# Patient Record
Sex: FEMALE | Race: WHITE
[De-identification: names, ages, dates, MRNs, and addresses within clinical notes are randomized per-mention and may not be internally consistent; named-entity substitution may affect disease eponyms.]

---

## 2018-04-03 ENCOUNTER — HOSPITAL ENCOUNTER (INPATIENT)
Dept: HOSPITAL 80 - F3E | Age: 43
LOS: 2 days | Discharge: HOME | DRG: 743 | End: 2018-04-05
Attending: OBSTETRICS & GYNECOLOGY | Admitting: OBSTETRICS & GYNECOLOGY
Payer: COMMERCIAL

## 2018-04-03 DIAGNOSIS — F32.81: ICD-10-CM

## 2018-04-03 DIAGNOSIS — D25.9: Primary | ICD-10-CM

## 2018-04-03 DIAGNOSIS — N80.0: ICD-10-CM

## 2018-04-03 LAB — PLATELET # BLD: 377 10^3/UL (ref 150–400)

## 2018-04-03 PROCEDURE — 0UT90ZZ RESECTION OF UTERUS, OPEN APPROACH: ICD-10-PCS | Performed by: OBSTETRICS & GYNECOLOGY

## 2018-04-03 PROCEDURE — 0UTC0ZZ RESECTION OF CERVIX, OPEN APPROACH: ICD-10-PCS | Performed by: OBSTETRICS & GYNECOLOGY

## 2018-04-03 RX ADMIN — ENOXAPARIN SODIUM SCH MG: 100 INJECTION SUBCUTANEOUS at 21:06

## 2018-04-03 RX ADMIN — GABAPENTIN SCH MG: 100 CAPSULE ORAL at 19:59

## 2018-04-03 RX ADMIN — OXYCODONE HYDROCHLORIDE AND ACETAMINOPHEN PRN TAB: 5; 325 TABLET ORAL at 16:26

## 2018-04-03 RX ADMIN — KETOROLAC TROMETHAMINE SCH MG: 30 INJECTION, SOLUTION INTRAMUSCULAR at 17:58

## 2018-04-03 RX ADMIN — Medication PRN MCG: at 13:39

## 2018-04-03 RX ADMIN — Medication PRN MCG: at 12:59

## 2018-04-03 NOTE — POSTOPPROG
Post Op Note


Date of Operation: 04/03/18


Surgeon: Horacio Yee


Assistant: Rae Borrero


Anesthesiologist: Beatriz Pereira


Anesthesia: GET(General Endotracheal), Spinal


Pre-op Diagnosis: Uterine fibroids, pelvic pain, abnormal uterine bleeding, PMDD


Post-op Diagnosis: Same, Stage I endometriosis


Procedure: Total abdominal hysterectomy, bilateral salpingo-oophorectomy, 

cystoscopy


Findings: Large, bulky fibroid uterus, small ruptured chocolate cyst left ovary


Inf/Abcess present in the surg proc area at time of surgery?: No


EBL: 80cc


Complications: 





None


Specimen(s): 





Uterus and cervix, bilateral tubes and ovaries

## 2018-04-03 NOTE — SUROPNOTE
[facility] Operative Report





- Surgery





Date of Operation: 04/03/18


Surgeon: Horacio Yee


Assistant: Rae Borrero


Anesthesiologist: Beatriz Pereira


Anesthesia: GET(General Endotracheal), Spinal


Pre-op Diagnosis: Uterine fibroids, pelvic pain, abnormal uterine bleeding, PMDD


Post-op Diagnosis: Same, Stage I endometriosis


Procedure: Total abdominal hysterectomy, bilateral salpingo-oophorectomy, 

cystoscopy


Findings: Large, bulky fibroid uterus, small ruptured chocolate cyst left ovary


Inf/Abcess present in the surg proc area at time of surgery?: No


EBL: 80cc


Complications: None


Specimen(s): Uterus and cervix, bilateral tubes and ovaries


Technique:The patient was brought to the operating room where she had a spinal 

placed and following this, general anesthesia was found to be adequate. She was 

positioned in low lithotomy and an exam under anesthesia was performed. She was 

prepped and draped in the usual sterile fashion and a ziegler catheter was placed 

under sterile conditions.  Weight-based antibiotics were given, as well as 5,

000 units of subcutaneous heparin.





A vertical midline incision was made with a scalpel from the pubic symphysis to 

the inferior aspect of the umbilicus, and carried down to the level of the 

fascia using the bovie. The fascia was nicked in the midline and this incision 

was extended the length of the skin incision. The peritoneum was elevated, 

incised and this incision extended with care taken to note the position of the 

bladder. Upon entry into the abdomen, the abdomen and pelvis were explored with 

the findings noted above. Pelvic washings were not collected. A self-retaining 

[place] retractor was placed and retractors placed for optimal visualization. 

Moist laparotomy sponges were used to pack the bowel cephalad.





The round ligaments were suture ligated and transected. The broad ligament was 

incised and this incision carried cephalad parallel to the ovarian vessels. The 

ureters were directly visualized bilaterally, although difficult to discern 

transperitoneally on the left. The infundibulopelvic ligaments were isolated, 

clamped, transected and suture ligated with 2 separate suture ligatures of 0 

Vicryl. The vesicouterine peritoneum was incised and the bladder flap was 

created using fine dissection and cautery. The uterine arteries were 

skeletonized, clamped, transected and suture ligated.  We also used the 

Ligasure device on the uterine pedicles due to their enlarged size on imaging 

due to large fibroids. The cardinal ligaments were clamped, transected and 

suture ligated. The uterosacral ligaments were clamped, transected and suture 

ligated. Josh Clamps were placed across the vaginal at the level of the 

cervico-vaginal junction and the uterus, cervix, tubes and ovaries were 

amputated using Penny scissors. 0 Vicryl sutures in Josh suture ligatures 

were placed at the vaginal cuff angles and the remainder of the vaginal cuff 

was closed with figure of eight type suture ligatures of 0 Vicryl. Five in 

total. Excellent hemostasis was obtained after spot treatments with the bovie 

and one additional figure of eight suture.





The pelvis was irrigated copiously. All vascular pedicles were hemostatic. All 

instruments and sponges were removed from the abdomen. The fascia was closed 

with two number 1 looped PDS in a running mass closure.  One suture starting 

from each corner and being tied in the midline. The skin was closed with 

staples. A sterile dressing was applied. 





We did conclude with cystoscopy using 22fr sheath, 70 degree scope.  Pt 

positioned into frog leg and scope inserted, bladder filled with 200cc sterile 

saline.  Bladder clearly had no sharp or appreciable thermal or suture 

injuries.  Water-tight.  Bilateral UO's with strong jets of Pyridium stained 

urine. The patient tolerated the procedure well. All sponge, needle and 

instrument counts were announced as correct at the end of the case times two.





I was scrubbed and present for the entire case, as was Dr. Borrero, assisting.

## 2018-04-03 NOTE — PDGENHP
History & Physical


Chief Complaint: Uterine fibroids, pelvic pain, abnormal uterine bleeding


History of Present Illness: Prisca is a 41 yo who I met in the office in 

consultation regarding large uterine fibroids.  She has significant pelvic pain 

from the uterus and abnormal bleeding. EBX benign. MRI showed 20cm uterus with 

11cm largest fibroid in addition to other large fibroids.  She has significant 

PMDD with each cycle, which she has had for years, and a significant 

psychiatric history as well.  Her depression and anxiety is tightly correlated 

with her monthly cycle and this has lead to a situation of significant 

alcoholism for which she has recently needed to be inpatient at a rehad 

facility and she feels that her drinking is trigger by her anxiety and 

depression which is trigger by her cycle.  Thus, she is absolutely interested 

in having ovaries removed at time of hysterectomy.


Pertinent Past, Social, Family History: Non-contributory other than EtOH 

history mentioned above.


Relevant Physical Exam: NAD, RRR, LCTAB.  Pelvic exam performed in office 

demonstrated large, bulky uterus extending above the umbilicus.  Overall mobile

, but quite tall and wide.  Cervix palpates normal vaginally.





Assessment & Plan


Assessment: 





41 yo with multiple very large uterine fibroids, pelvic pain, and abnormal 

uterine bleeding.  She also has significant anxiety, depression and alcoholism 

that are all triggered by her cycle, leading to a diagnosis of PMDD.  In 

additional to DOLLY, she would also like BSO.





Risks/benefits/alternatives discussed in the office, consents signed in person.


We specifically discussed the unique risks to oophorectomy before the age of 

natural menopause and she is comfortable with BSO and the knowlege that we 

should discuss using HRT until the age of approximately 48.  In her view - 

which I think is reasonable - benefits to improving her PMDD and its profound 

affect on her life, outweight risks of BSO.





We'll use a vertical midline incision, we did discuss that with large uteri 

these cases and take a longer time and carry increased intraoperative risks 

including, but not limited to compression injuries from our abdominal wall 

retractors.  She is thin so this risks is probably even higher for her.





Routine preop orders including weight-based antibiotics.


Heparin 5k units SQ to be given preop as well as 100mg PO Pyridium.





RADHA

## 2018-04-03 NOTE — POSTANESTH
Post Anesthetic Evaluation


Cardiovascular Status: Normal, Stable


Respiratory Status: Normal, Stable


Level of Consciousness/Mental Status: Can Participate in Eval, Moderately Sleepy


Pain Control: Adequate, Prn Tx Ordered


Nausea/Vomiting Control: Adequate, Prn Tx Ordered


Complications Possibly Related to Anesthesia: None Noted (Pt able to move both 

LE, so SAB receding well.)

## 2018-04-03 NOTE — PDANEPAE
ANE History of Present Illness





43 yo female with fibroids for DOLLY and BSO.





ANE Past Medical History





- Cardiovascular History


Hx Hypertension: No


Hx Arrhythmias: No


Hx Chest Pain: No


Hx Coronary Artery / Peripheral Vascular Disease: No


Hx CHF / Valvular Disease: No


Hx Palpitations: No





- Pulmonary History


Hx COPD: No


Hx Asthma/Reactive Airway Disease: No


Hx Recent Upper Respiratory Infection: No


Hx Oxygen in Use at Home: No


Hx Sleep Apnea: No


Sleep Apnea Screening Result - Last Documented: Negative





- Neurologic History


Hx Cerebrovascular Accident: No


Hx Seizures: No


Hx Dementia: No





- Endocrine History


Hx Diabetes: No


Hypothyroid: No


Hyperthyroid: No


Obesity: no





- Renal History


Hx Renal Disorders: No





- Liver History


Hx Hepatic Disorders: No





- Neurological & Psychiatric Hx


Hx Neurological and Psychiatric Disorders: Yes


Neurological / Psychiatric History Comment: anxiety





- Cancer History


Hx Cancer: Yes


Cancer History Comment: basal cell removed





- Congenital Disorder History


Hx Congenital Disorders: No





- GI History


Hx Gastrointestinal Disorders: No





- Other Health History


Other Health History: none





- Chronic Pain History


Chronic Pain: No





- Surgical History


Prior Surgeries: none





ANE Review of Systems


Review of systems is: negative


Review of Systems: 








- Exercise capacity


METS (RN): 5 METS





ANE Patient History





- Allergies


Allergies/Adverse Reactions: 








latex Allergy (Intermediate, Verified 03/15/18 10:35)


 Other-Enter Comments


Sulfa (Sulfonamide Antibiotics) Allergy (Mild, Verified 03/15/18 10:35)


 Other-Enter Comments








- Home Medications


Home medications: home medication list seen and reviewed


Home Medications: 








Gabapentin [Neurontin 100 MG (*)] 100 mg PO TID 01/20/18 [Last Taken 04/02/18]


traZODone [traZODONE 100MG (*)] 100 mg PO HS 03/13/18 [Last Taken 04/02/18]








- NPO status


NPO Since - Liquids (Date): 04/02/18


NPO Since - Liquids (Time): 23:55


NPO Since - Solids (Date): 04/02/18


NPO Since - Solids (Time): 20:00





- Anes Hx


Anes Hx: no prior problems





- Smoking Hx


Smoking Status: Never smoked





- Alcohol Use


Alcohol Use: Heavy (h/o EtOH abuse)





- Family Anes Hx


Family Anes Hx: neg - N/A


Family Hx Anesthesia Complications: none





ANE Labs/Vital Signs





- Vital Signs


Blood Pressure: 84/60


Heart Rate: 76


Respiratory Rate: 16


O2 Sat (%): 99


Height: 165.1 cm


Weight: 57.153 kg





ANE Physical Exam





- Airway


Neck exam: FROM


Mallampati Score: Class 3


Mouth exam: normal dental/mouth exam





- Pulmonary


Pulmonary: clear to auscultation





- Cardiovascular


Cardiovascular: regular rate and rhythym





- ASA Status


ASA Status: II





ANE Anesthesia Plan


Anesthesia Plan: general endotracheal anesthesia, spinal

## 2018-04-03 NOTE — PDMN
Medical Necessity


Medical necessity: Patient meets inpatient criteria per physician note and [facility]

-650 Hysterectomy, Abdominal - 2 days postop - ( CPT 48162 / Medicare inpatient-

only surgery.)

## 2018-04-04 RX ADMIN — ENOXAPARIN SODIUM SCH MG: 100 INJECTION SUBCUTANEOUS at 09:37

## 2018-04-04 RX ADMIN — OXYCODONE HYDROCHLORIDE AND ACETAMINOPHEN PRN TAB: 5; 325 TABLET ORAL at 17:17

## 2018-04-04 RX ADMIN — IBUPROFEN SCH MG: 600 TABLET ORAL at 18:15

## 2018-04-04 RX ADMIN — OXYCODONE HYDROCHLORIDE AND ACETAMINOPHEN PRN TAB: 5; 325 TABLET ORAL at 20:47

## 2018-04-04 RX ADMIN — OXYCODONE HYDROCHLORIDE AND ACETAMINOPHEN PRN TAB: 5; 325 TABLET ORAL at 12:04

## 2018-04-04 RX ADMIN — KETOROLAC TROMETHAMINE SCH MG: 30 INJECTION, SOLUTION INTRAMUSCULAR at 05:37

## 2018-04-04 RX ADMIN — ENOXAPARIN SODIUM SCH MG: 100 INJECTION SUBCUTANEOUS at 20:45

## 2018-04-04 RX ADMIN — GABAPENTIN SCH MG: 100 CAPSULE ORAL at 08:38

## 2018-04-04 RX ADMIN — KETOROLAC TROMETHAMINE SCH MG: 30 INJECTION, SOLUTION INTRAMUSCULAR at 00:03

## 2018-04-04 RX ADMIN — GABAPENTIN SCH MG: 100 CAPSULE ORAL at 16:02

## 2018-04-04 RX ADMIN — GABAPENTIN SCH MG: 100 CAPSULE ORAL at 20:46

## 2018-04-04 RX ADMIN — IBUPROFEN SCH MG: 600 TABLET ORAL at 18:13

## 2018-04-04 RX ADMIN — IBUPROFEN SCH: 600 TABLET ORAL at 18:15

## 2018-04-04 RX ADMIN — KETOROLAC TROMETHAMINE SCH MG: 30 INJECTION, SOLUTION INTRAMUSCULAR at 11:56

## 2018-04-05 VITALS
RESPIRATION RATE: 16 BRPM | DIASTOLIC BLOOD PRESSURE: 69 MMHG | HEART RATE: 57 BPM | TEMPERATURE: 98.5 F | OXYGEN SATURATION: 99 % | SYSTOLIC BLOOD PRESSURE: 99 MMHG

## 2018-04-05 RX ADMIN — OXYCODONE HYDROCHLORIDE AND ACETAMINOPHEN PRN TAB: 5; 325 TABLET ORAL at 11:48

## 2018-04-05 RX ADMIN — OXYCODONE HYDROCHLORIDE AND ACETAMINOPHEN PRN TAB: 5; 325 TABLET ORAL at 02:30

## 2018-04-05 RX ADMIN — ENOXAPARIN SODIUM SCH MG: 100 INJECTION SUBCUTANEOUS at 08:56

## 2018-04-05 RX ADMIN — OXYCODONE HYDROCHLORIDE AND ACETAMINOPHEN PRN TAB: 5; 325 TABLET ORAL at 07:10

## 2018-04-05 RX ADMIN — IBUPROFEN SCH MG: 600 TABLET ORAL at 08:54

## 2018-04-05 RX ADMIN — IBUPROFEN SCH MG: 600 TABLET ORAL at 02:29

## 2018-04-05 RX ADMIN — IBUPROFEN SCH MG: 600 TABLET ORAL at 14:05

## 2018-04-05 RX ADMIN — GABAPENTIN SCH MG: 100 CAPSULE ORAL at 08:55

## 2018-04-05 NOTE — SOAPPROG
SOAP Progress Note


Assessment/Plan: 


Assessment:


























Plan:





Objective: 





 Vital Signs











Temp Pulse Resp BP Pulse Ox


 


 36.9 C   57 L  16   99/69 L  99 


 


 04/05/18 11:54  04/05/18 11:54  04/05/18 11:54  04/05/18 11:54  04/05/18 11:54








 Laboratory Results





 04/04/18 05:45 





 











 04/04/18 04/05/18 04/06/18





 05:59 05:59 05:59


 


Intake Total 4830  


 


Output Total 8668 7594 


 


Balance 975 -1950

## 2019-06-20 ENCOUNTER — HOSPITAL ENCOUNTER (OUTPATIENT)
Dept: HOSPITAL 80 - FIMAGING | Age: 44
End: 2019-06-20
Payer: COMMERCIAL